# Patient Record
Sex: MALE | Race: WHITE | NOT HISPANIC OR LATINO
[De-identification: names, ages, dates, MRNs, and addresses within clinical notes are randomized per-mention and may not be internally consistent; named-entity substitution may affect disease eponyms.]

---

## 2018-12-19 PROBLEM — Z00.00 ENCOUNTER FOR PREVENTIVE HEALTH EXAMINATION: Status: ACTIVE | Noted: 2018-12-19

## 2019-01-04 ENCOUNTER — APPOINTMENT (OUTPATIENT)
Dept: COLORECTAL SURGERY | Facility: CLINIC | Age: 29
End: 2019-01-04
Payer: COMMERCIAL

## 2019-01-04 VITALS
SYSTOLIC BLOOD PRESSURE: 131 MMHG | BODY MASS INDEX: 26.81 KG/M2 | WEIGHT: 187.25 LBS | DIASTOLIC BLOOD PRESSURE: 81 MMHG | HEART RATE: 61 BPM | TEMPERATURE: 99.1 F | HEIGHT: 70 IN

## 2019-01-04 DIAGNOSIS — Z80.42 FAMILY HISTORY OF MALIGNANT NEOPLASM OF PROSTATE: ICD-10-CM

## 2019-01-04 DIAGNOSIS — K58.8 OTHER IRRITABLE BOWEL SYNDROME: ICD-10-CM

## 2019-01-04 DIAGNOSIS — K60.2 ANAL FISSURE, UNSPECIFIED: ICD-10-CM

## 2019-01-04 DIAGNOSIS — Z78.9 OTHER SPECIFIED HEALTH STATUS: ICD-10-CM

## 2019-01-04 PROCEDURE — 99202 OFFICE O/P NEW SF 15 MIN: CPT

## 2019-01-07 NOTE — ASSESSMENT
[FreeTextEntry1] : I suspect he has a recently healed anal fissure that was attributed to his prior change in bowel habits. Recommend continuing efforts to normalize his bowel pattern. Increased daily fiber supplementation and water. Return for repeat evaluation symptoms recur.

## 2019-01-07 NOTE — PHYSICAL EXAM
[Normal rectal exam] : exam was normal [Normal] : was normal [None] : there was no rectal mass  [Excoriation] : no perianal excoriation [de-identified] : suspected scarring from recent healed anterior fissure [FreeTextEntry1] : A lighted anoscope was passed into the anal canal and the entire anal mucosal surface was inspected..  THe findings revealed mild/mod internal hemorrhoids. No masses or lesions were identified.\par \par

## 2019-01-07 NOTE — HISTORY OF PRESENT ILLNESS
[FreeTextEntry1] : 29 y/o M presents for evaluation of hemorrhoids, referred by Dr. Middleton\par Had presented to him for evaluation of intermittent abdominal pain, around the same time noticed change in BM's fluctuating from constipation to diarrhea, stool cultures collected but results not available at this time\par Reports BRBPR on the TP occasionally and anal discomfort\par BH: Daily\par No issues with constipation or diarrhea currently\par Reports adequate dietary fiber and water intake\par Took Miralax during period of constipation, not using currently\par Prescribed Proctozone ointment with mild to moderate improvement in symptoms\par Never had a colonoscopy  \par 2014 had upper abdominal pains, EGD done with no abnormal findings\par ASA and Acetaminophen taken 3-4 days ago

## 2019-01-07 NOTE — CONSULT LETTER
[Dear  ___] : Dear  [unfilled], [Consult Letter:] : I had the pleasure of evaluating your patient, [unfilled]. [( Thank you for referring [unfilled] for consultation for _____ )] : Thank you for referring [unfilled] for consultation for [unfilled] [Please see my note below.] : Please see my note below. [Consult Closing:] : Thank you very much for allowing me to participate in the care of this patient.  If you have any questions, please do not hesitate to contact me. [Sincerely,] : Sincerely, [FreeTextEntry2] : Dr. Torin Middleton\par [address] [place] [address]\par [facility] [id number] [FreeTextEntry3] : David Pitts MD

## 2020-02-21 ENCOUNTER — APPOINTMENT (OUTPATIENT)
Dept: COLORECTAL SURGERY | Facility: CLINIC | Age: 30
End: 2020-02-21
Payer: COMMERCIAL

## 2020-02-21 VITALS
BODY MASS INDEX: 26.77 KG/M2 | HEART RATE: 86 BPM | WEIGHT: 187 LBS | HEIGHT: 70 IN | DIASTOLIC BLOOD PRESSURE: 83 MMHG | TEMPERATURE: 97.7 F | SYSTOLIC BLOOD PRESSURE: 147 MMHG

## 2020-02-21 DIAGNOSIS — K64.8 OTHER HEMORRHOIDS: ICD-10-CM

## 2020-02-21 PROCEDURE — 46600 DIAGNOSTIC ANOSCOPY SPX: CPT

## 2020-02-21 PROCEDURE — 99214 OFFICE O/P EST MOD 30 MIN: CPT | Mod: 25

## 2020-02-21 RX ORDER — HYDROCORTISONE 2.5% 25 MG/G
2.5 CREAM TOPICAL
Refills: 0 | Status: DISCONTINUED | COMMUNITY
End: 2020-02-21

## 2020-02-21 NOTE — PHYSICAL EXAM
[Normal rectal exam] : exam was normal [Normal] : was normal [Excoriation] : no perianal excoriation [FreeTextEntry1] : A lighted anoscope was passed into the anal canal and the entire anal mucosal surface was inspected..  The findings revealed moderate internal hemorrhoids. No masses or lesions were identified.\par \par  [None] : no

## 2020-02-21 NOTE — ASSESSMENT
[FreeTextEntry1] : At this time the patient has no findings on examination explain his current symptomatology. However, I suspect that he had an episode of acute hemorrhoidal inflammation that has now resolved. These symptoms most likely were secondary to a change in his bowel habits.\par \par I advised the patient at symptoms recur he'll be seen promptly for repeat evaluation.\par \par Fiber guidelines detail and reviewed\par

## 2020-02-21 NOTE — HISTORY OF PRESENT ILLNESS
[FreeTextEntry1] : 30 y/o M presents for f/u anal fissure vs hemorrhoids\par Last seen 1/4/19, symptoms resolved until approximately 1-2 months ago\par C/o throbbing aching pain and itching with constipation\par Reports occasional BRBPR on the TP when cleaning\par BH: Daily\par Admits to occasional constipation and straining\par Reports adequate dietary fiber and water intake (6+ cups of water daily)\par Using Miralax PRN for constipation\par Has tried Prep H ointment with mild improvement in symptoms\par Never had a colonoscopy\par No ASA/NSAIDs last 7 days  \par